# Patient Record
Sex: MALE | Race: WHITE | URBAN - METROPOLITAN AREA
[De-identification: names, ages, dates, MRNs, and addresses within clinical notes are randomized per-mention and may not be internally consistent; named-entity substitution may affect disease eponyms.]

---

## 2018-08-12 ENCOUNTER — TRANSFERRED RECORDS (OUTPATIENT)
Dept: HEALTH INFORMATION MANAGEMENT | Facility: CLINIC | Age: 38
End: 2018-08-12

## 2018-08-30 ENCOUNTER — OFFICE VISIT (OUTPATIENT)
Dept: INTERNAL MEDICINE | Facility: CLINIC | Age: 38
End: 2018-08-30
Payer: COMMERCIAL

## 2018-08-30 VITALS
WEIGHT: 217.1 LBS | SYSTOLIC BLOOD PRESSURE: 126 MMHG | BODY MASS INDEX: 30.23 KG/M2 | DIASTOLIC BLOOD PRESSURE: 88 MMHG | HEART RATE: 90 BPM | OXYGEN SATURATION: 95 % | RESPIRATION RATE: 20 BRPM

## 2018-08-30 DIAGNOSIS — R06.02 SOB (SHORTNESS OF BREATH): ICD-10-CM

## 2018-08-30 DIAGNOSIS — E80.6 HYPERBILIRUBINEMIA: ICD-10-CM

## 2018-08-30 DIAGNOSIS — E78.2 MIXED HYPERLIPIDEMIA: ICD-10-CM

## 2018-08-30 DIAGNOSIS — R16.1 SPLENOMEGALY: Primary | ICD-10-CM

## 2018-08-30 ASSESSMENT — PAIN SCALES - GENERAL: PAINLEVEL: NO PAIN (0)

## 2018-08-30 NOTE — PATIENT INSTRUCTIONS
[facility] Medication Refill Request Information:  * Please contact your pharmacy regarding ANY request for medication refills.  ** [facility] Prescription Fax = [phone]  * Please allow 3 business days for routine medication refills.  * Please allow 5 business days for controlled substance medication refills.     [facility] Test Result notification information:  *You will be notified with in 7-10 days of your appointment day regarding the results of your test.  If you are on MyChart you will be notified as soon as the provider has reviewed the results and signed off on them.    [facility]: [phone]       Short of breath and increased heart rate: chest x-ray, d-dimer, EKG
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
## 2018-08-30 NOTE — NURSING NOTE
"Chief Complaint   Patient presents with     ER F/U     Went to Emergency room (8/1/2/18) at [facility] for \" SOB, racing pulse, nausea and light headed.\"   Joselyn Hayward LPN 2:20 PM on 8/30/2018  Has not been checked HIV.Joselyn Hayward LPN 2:20 PM on 8/30/2018  .Rooming Note  Health Maintenance   Health Maintenance Due   Topic Date Due     PHQ-2 Q1 YR  06/11/1994     HIV SCREEN (SYSTEM ASSIGNED)  06/11/2000    All health maintenance items UP TO DATE  Joselyn Hayward LPN 2:21 PM on 8/30/2018    "

## 2018-08-30 NOTE — MR AVS SNAPSHOT
After Visit Summary   8/30/2018    Osito Merida    MRN: [id number]           Patient Information     Date Of Birth          1980        Visit Information        Provider Department      8/30/2018 2:00 PM Deshawn Reynolds MD [facility]        Today's Diagnoses     Splenomegaly    -  1    Mixed hyperlipidemia        SOB (shortness of breath)        Hyperbilirubinemia          Care Instructions    [facility] Medication Refill Request Information:  * Please contact your pharmacy regarding ANY request for medication refills.  ** [facility] Prescription Fax = [phone]  * Please allow 3 business days for routine medication refills.  * Please allow 5 business days for controlled substance medication refills.     [facility] Test Result notification information:  *You will be notified with in 7-10 days of your appointment day regarding the results of your test.  If you are on MyChart you will be notified as soon as the provider has reviewed the results and signed off on them.    [facility]: [phone]       Short of breath and increased heart rate: chest x-ray, d-dimer, EKG          Follow-ups after your visit        Future tests that were ordered for you today     Open Future Orders        Priority Expected Expires Ordered    US Abdomen Limited Routine  8/30/2019 8/30/2018    Comprehensive metabolic panel Routine 9/6/2018 11/30/2018 8/30/2018    CBC with platelets differential Routine 9/6/2018 11/30/2018 8/30/2018    Lipid panel reflex to direct LDL Fasting Routine 9/6/2018 11/30/2018 8/30/2018    CRP inflammation Routine 9/6/2018 11/30/2018 8/30/2018            Who to contact     Please call your clinic at [phone] to:    Ask questions about your health    Make or cancel appointments    Discuss your medicines    Learn about your test results    Speak to your doctor            Additional Information About Your Visit        MyChart Information     MyChart  is an electronic gateway that provides easy, online access to your medical records. With [email or web address], you can request a clinic appointment, read your test results, renew a prescription or communicate with your care team.     To sign up for [email or web address] visit the website at [email or web address]   You will be asked to enter the access code listed below, as well as some personal information. Please follow the directions to create your username and password.     Your access code is: [email or web address]  Expires: 11/15/2018  6:30 AM     Your access code will  in 90 days. If you need help or a new code, please contact your [facility] or call [phone] for assistance.        Care EveryWhere ID     This is your Care EveryWhere ID. This could be used by other organizations to access your [facility] medical records  [id number]        Your Vitals Were     Pulse Respirations Pulse Oximetry BMI (Body Mass Index)          90 20 95% 30.23 kg/m2         Blood Pressure from Last 3 Encounters:   18 126/88   16 123/82    Weight from Last 3 Encounters:   18 98.5 kg (217 lb 1.6 oz)   16 101.3 kg (223 lb 4.8 oz)               Primary Care Provider    None Specified       No primary provider on file.        Equal Access to Services     NATASHA STERLING AH: Hadii jose luis abbasio Sobennettali, waaxda luqadaha, qaybta kaalmada adeegyada, louis brooks . So [facility] [phone].    ATENCIÓN: Si habla español, tiene a goldstein disposición servicios gratuitos de asistencia lingüística. Llame al [phone].    We comply with applicable federal civil rights laws and [place] laws. We do not discriminate on the basis of race, color, national origin, age, disability, sex, sexual orientation, or gender identity.            Thank you!     Thank you for choosing [facility]  for your care. Our goal is always to provide you with excellent care. Hearing back from our  patients is one way we can continue to improve our services. Please take a few minutes to complete the written survey that you may receive in the mail after your visit with us. Thank you!             Your Updated Medication List - Protect others around you: Learn how to safely use, store and throw away your medicines at www.disposemymeds.org.      Notice  As of 8/30/2018  3:03 PM    You have not been prescribed any medications.

## 2019-08-14 ENCOUNTER — TRANSFERRED RECORDS (OUTPATIENT)
Dept: HEALTH INFORMATION MANAGEMENT | Facility: CLINIC | Age: 39
End: 2019-08-14